# Patient Record
Sex: FEMALE | Race: WHITE | Employment: OTHER
[De-identification: names, ages, dates, MRNs, and addresses within clinical notes are randomized per-mention and may not be internally consistent; named-entity substitution may affect disease eponyms.]

---

## 2024-05-21 ENCOUNTER — TRANSCRIBE ORDERS (OUTPATIENT)
Dept: OTHER | Age: 79
End: 2024-05-21

## 2024-05-21 DIAGNOSIS — H30.93 UVEITIS, POSTERIOR, BILATERAL: Primary | ICD-10-CM

## 2024-08-02 DIAGNOSIS — H20.9 ANTERIOR UVEITIS: Primary | ICD-10-CM

## 2024-08-02 DIAGNOSIS — H47.13 PAPILLEDEMA ASSOCIATED WITH RETINAL DISORDER: ICD-10-CM

## 2024-08-02 DIAGNOSIS — H35.353 CYSTOID MACULAR EDEMA OF BOTH EYES: ICD-10-CM

## 2024-08-09 ENCOUNTER — OFFICE VISIT (OUTPATIENT)
Dept: OPHTHALMOLOGY | Facility: CLINIC | Age: 79
End: 2024-08-09
Attending: OPHTHALMOLOGY
Payer: MEDICARE

## 2024-08-09 DIAGNOSIS — H35.353 CYSTOID MACULAR EDEMA OF BOTH EYES: ICD-10-CM

## 2024-08-09 DIAGNOSIS — H20.9 ANTERIOR UVEITIS: Primary | ICD-10-CM

## 2024-08-09 DIAGNOSIS — H35.342 LAMELLAR MACULAR HOLE OF LEFT EYE: ICD-10-CM

## 2024-08-09 PROCEDURE — 92015 DETERMINE REFRACTIVE STATE: CPT | Mod: GY

## 2024-08-09 PROCEDURE — G0463 HOSPITAL OUTPT CLINIC VISIT: HCPCS | Performed by: OPHTHALMOLOGY

## 2024-08-09 PROCEDURE — 99203 OFFICE O/P NEW LOW 30 MIN: CPT | Performed by: OPHTHALMOLOGY

## 2024-08-09 PROCEDURE — 92134 CPTRZ OPH DX IMG PST SGM RTA: CPT | Performed by: OPHTHALMOLOGY

## 2024-08-09 RX ORDER — FLUTICASONE FUROATE, UMECLIDINIUM BROMIDE AND VILANTEROL TRIFENATATE 200; 62.5; 25 UG/1; UG/1; UG/1
1 POWDER RESPIRATORY (INHALATION)
COMMUNITY
Start: 2023-07-25

## 2024-08-09 RX ORDER — ESCITALOPRAM OXALATE 10 MG/1
10 TABLET ORAL
COMMUNITY
Start: 2024-06-12 | End: 2025-06-12

## 2024-08-09 RX ORDER — AMLODIPINE BESYLATE 5 MG/1
5 TABLET ORAL DAILY
COMMUNITY
Start: 2023-12-19

## 2024-08-09 RX ORDER — OLMESARTAN MEDOXOMIL 40 MG/1
40 TABLET ORAL
COMMUNITY
Start: 2024-06-12 | End: 2025-06-12

## 2024-08-09 RX ORDER — ASPIRIN 81 MG/1
81 TABLET ORAL DAILY
COMMUNITY

## 2024-08-09 RX ORDER — ROSUVASTATIN CALCIUM 10 MG/1
TABLET, COATED ORAL
COMMUNITY
Start: 2024-06-12

## 2024-08-09 ASSESSMENT — VISUAL ACUITY
OS_SC: 20/40
OD_PH_CC: 20/25
METHOD: SNELLEN - LINEAR
OD_SC: 20/40
OS_CC: 20/50
CORRECTION_TYPE: GLASSES
OD_CC+: -1
OD_CC: 20/50
OS_PH_CC: 20/40
OS_PH_CC+: +1
OS_SC+: -1
OS_CC+: +1

## 2024-08-09 ASSESSMENT — REFRACTION_MANIFEST
OS_CYLINDER: +0.50
OD_SPHERE: -1.00
OS_ADD: +2.50
OS_SPHERE: -1.25
OD_ADD: +2.50
OD_AXIS: 178
OS_AXIS: 072
OD_CYLINDER: +0.75

## 2024-08-09 ASSESSMENT — CONF VISUAL FIELD
OS_SUPERIOR_TEMPORAL_RESTRICTION: 0
OD_NORMAL: 1
OD_SUPERIOR_TEMPORAL_RESTRICTION: 0
OD_INFERIOR_TEMPORAL_RESTRICTION: 0
METHOD: COUNTING FINGERS
OS_INFERIOR_NASAL_RESTRICTION: 0
OS_NORMAL: 1
OD_INFERIOR_NASAL_RESTRICTION: 0
OS_INFERIOR_TEMPORAL_RESTRICTION: 0
OD_SUPERIOR_NASAL_RESTRICTION: 0
OS_SUPERIOR_NASAL_RESTRICTION: 0

## 2024-08-09 ASSESSMENT — TONOMETRY
IOP_METHOD: TONOPEN
OD_IOP_MMHG: 19
OS_IOP_MMHG: 16

## 2024-08-09 ASSESSMENT — EXTERNAL EXAM - LEFT EYE: OS_EXAM: NORMAL

## 2024-08-09 ASSESSMENT — REFRACTION_WEARINGRX
OD_ADD: +2.50
OS_CYLINDER: +0.75
OD_AXIS: 176
OS_SPHERE: -0.50
OD_SPHERE: PLANO
SPECS_TYPE: PAL
OS_AXIS: 072
OD_CYLINDER: +0.75
OS_ADD: +2.50

## 2024-08-09 ASSESSMENT — SLIT LAMP EXAM - LIDS
COMMENTS: NORMAL
COMMENTS: NORMAL

## 2024-08-09 ASSESSMENT — CUP TO DISC RATIO
OS_RATIO: 0.2
OD_RATIO: 0.2

## 2024-08-09 ASSESSMENT — EXTERNAL EXAM - RIGHT EYE: OD_EXAM: NORMAL

## 2024-08-09 NOTE — LETTER
8/9/2024       RE: Daisha Lancaster  [address]  [place] [address]     Dear Dr. Mooney,    Thank you for referring your patient, Daisha Lancaster, to the [facility] at [facility]. Her uveitis was quiescent when I saw her in clinic, so I did not recommend restarting treatment. She is planning to see you in follow-up once she returns to [place]. Please see a copy of my visit note below.    HPI: Daisha Lancaster is a 78 year old female referred by Dr. Vinay Mooney for ongoing uveitis/CME care. Ms. Lancaster reports that her eyes are doing well today. A few weeks ago, she was having frequent tearing both eyes and mild blurry vision; these symptoms have resolved since restarting artificial tears.    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   - Follows with Dr. Coulter at [facility] (but has not seen him yet this year); recommend continuing routine follow up with him  Pseudophakia, good intraocular lens position both eyes.  - Monitor  Dry eye syndrome both eyes.   - Continue artificial tears both eyes as needed  Refractive error / presbyopia both eyes.   - Manifest refraction today    Follow up with Dr. Mooney as scheduled once she returns to [place], follow up in [facility] uveitis clinic as needed.    Attending Physician Attestation:  Complete documentation of historical and exam elements from today's encounter can be found in the full encounter summary report (not reduplicated in this progress note).  I personally obtained the chief complaint(s) and history of present illness.  I confirmed and edited as necessary the review of systems, past medical/surgical history, family history, social history, and examination findings as documented by others; and I examined the patient myself.  I personally reviewed the relevant tests, images, and reports as documented above.  I formulated and edited as necessary the assessment and plan and discussed the findings and management plan with the patient and family.  - Alexus Cook M.D.    Again, thank you for allowing me to participate in the care of your patient.      Sincerely,    Alexus Cook MD

## 2024-08-09 NOTE — PROGRESS NOTES
HPI: Daisha Lancaster is a 78 year old female referred by Dr. Vinay Mooney for ongoing uveitis/CME care. Ms. Lancaster reports that her eyes are doing well today. A few weeks ago, she was having frequent tearing both eyes and mild blurry vision; these symptoms have resolved since restarting artificial tears.    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   - Follows with Dr. Coulter at [facility] (but has not seen him yet this year); recommend continuing routine follow up with him  Pseudophakia, good intraocular lens position both eyes.  -  Monitor  Dry eye syndrome both eyes.   - Continue artificial tears both eyes as needed  Refractive error / presbyopia both eyes.   - Manifest refraction today    Follow up with Dr. Mooney as scheduled once she returns to [place], follow up in [facility] uveitis clinic as needed.    Attending Physician Attestation:  Complete documentation of historical and exam elements from today's encounter can be found in the full encounter summary report (not reduplicated in this progress note).  I personally obtained the chief complaint(s) and history of present illness.  I confirmed and edited as necessary the review of systems, past medical/surgical history, family history, social history, and examination findings as documented by others; and I examined the patient myself.  I personally reviewed the relevant tests, images, and reports as documented above.  I formulated and edited as necessary the assessment and plan and discussed the findings and management plan with the patient and family.  - Alexus Cook M.D.

## 2024-08-09 NOTE — NURSING NOTE
"Chief Complaints and History of Present Illnesses   Patient presents with    Consult For     Anterior uveitis - Both Eyes referred by Dr. Garvey        Chief Complaint(s) and History of Present Illness(es)       Consult For              Laterality: both eyes    Course: stable    Associated symptoms: dryness, tearing and photophobia (wears a viser to reduce her light sensitivity).  Negative for eye pain    Treatments tried: artificial tears    Pain scale: 0/10    Comments: Anterior uveitis - Both Eyes referred by Dr. Garvey                 Comments    Two weeks ago her left eye was \"bloodshot\".   She used artificial tears and the eye has improved.  Sometimes she get excessive tearing.      Melania Blair, COT 11:55 AM  August 9, 2024                        "